# Patient Record
Sex: MALE | Race: WHITE | NOT HISPANIC OR LATINO | Employment: UNEMPLOYED | URBAN - METROPOLITAN AREA
[De-identification: names, ages, dates, MRNs, and addresses within clinical notes are randomized per-mention and may not be internally consistent; named-entity substitution may affect disease eponyms.]

---

## 2022-09-28 ENCOUNTER — HOSPITAL ENCOUNTER (EMERGENCY)
Facility: HOSPITAL | Age: 32
Discharge: PSYCHIATRIC OR SUBSTANCE USE FACILITY | End: 2022-09-29
Attending: EMERGENCY MEDICINE

## 2022-09-28 DIAGNOSIS — Z00.8 MEDICAL CLEARANCE FOR PSYCHIATRIC ADMISSION: ICD-10-CM

## 2022-09-28 DIAGNOSIS — R74.8 ELEVATED CPK: ICD-10-CM

## 2022-09-28 DIAGNOSIS — E87.6 HYPOKALEMIA: ICD-10-CM

## 2022-09-28 DIAGNOSIS — F29 PSYCHOSIS, UNSPECIFIED PSYCHOSIS TYPE: Primary | ICD-10-CM

## 2022-09-28 DIAGNOSIS — F29 PSYCHOSIS: ICD-10-CM

## 2022-09-28 PROBLEM — E87.20 METABOLIC ACIDOSIS: Status: ACTIVE | Noted: 2022-09-28

## 2022-09-28 PROBLEM — R41.82 ALTERED MENTAL STATUS: Status: ACTIVE | Noted: 2022-09-28

## 2022-09-28 PROBLEM — R94.31 PROLONGED Q-T INTERVAL ON ECG: Status: ACTIVE | Noted: 2022-09-28

## 2022-09-28 PROBLEM — D72.829 LEUKOCYTOSIS: Status: ACTIVE | Noted: 2022-09-28

## 2022-09-28 LAB
ALBUMIN SERPL BCP-MCNC: 3.4 G/DL (ref 3.5–5.2)
ALBUMIN SERPL BCP-MCNC: 3.9 G/DL (ref 3.5–5.2)
ALP SERPL-CCNC: 62 U/L (ref 55–135)
ALP SERPL-CCNC: 82 U/L (ref 55–135)
ALT SERPL W/O P-5'-P-CCNC: 26 U/L (ref 10–44)
ALT SERPL W/O P-5'-P-CCNC: 31 U/L (ref 10–44)
AMMONIA PLAS-SCNC: 32 UMOL/L (ref 10–50)
AMPHET+METHAMPHET UR QL: NEGATIVE
ANION GAP SERPL CALC-SCNC: 16 MMOL/L (ref 8–16)
ANION GAP SERPL CALC-SCNC: 8 MMOL/L (ref 8–16)
APAP SERPL-MCNC: <3 UG/ML (ref 10–20)
AST SERPL-CCNC: 32 U/L (ref 10–40)
AST SERPL-CCNC: 41 U/L (ref 10–40)
BARBITURATES UR QL SCN>200 NG/ML: NEGATIVE
BASOPHILS # BLD AUTO: 0.06 K/UL (ref 0–0.2)
BASOPHILS # BLD AUTO: 0.08 K/UL (ref 0–0.2)
BASOPHILS NFR BLD: 0.4 % (ref 0–1.9)
BASOPHILS NFR BLD: 0.5 % (ref 0–1.9)
BENZODIAZ UR QL SCN>200 NG/ML: NEGATIVE
BILIRUB SERPL-MCNC: 1.6 MG/DL (ref 0.1–1)
BILIRUB SERPL-MCNC: 1.9 MG/DL (ref 0.1–1)
BILIRUB UR QL STRIP: NEGATIVE
BUN SERPL-MCNC: 7 MG/DL (ref 6–20)
BUN SERPL-MCNC: 9 MG/DL (ref 6–20)
BZE UR QL SCN: NEGATIVE
CALCIUM SERPL-MCNC: 8.6 MG/DL (ref 8.7–10.5)
CALCIUM SERPL-MCNC: 8.6 MG/DL (ref 8.7–10.5)
CALCIUM SERPL-MCNC: 8.8 MG/DL (ref 8.7–10.5)
CALCIUM SERPL-MCNC: 8.8 MG/DL (ref 8.7–10.5)
CANNABINOIDS UR QL SCN: ABNORMAL
CHLORIDE SERPL-SCNC: 106 MMOL/L (ref 95–110)
CHLORIDE SERPL-SCNC: 106 MMOL/L (ref 95–110)
CHLORIDE SERPL-SCNC: 107 MMOL/L (ref 95–110)
CHLORIDE SERPL-SCNC: 107 MMOL/L (ref 95–110)
CK SERPL-CCNC: 614 U/L (ref 20–200)
CK SERPL-CCNC: 706 U/L (ref 20–200)
CK SERPL-CCNC: 792 U/L (ref 20–200)
CK SERPL-CCNC: 957 U/L (ref 20–200)
CLARITY UR: CLEAR
CO2 SERPL-SCNC: 18 MMOL/L (ref 23–29)
CO2 SERPL-SCNC: 26 MMOL/L (ref 23–29)
CO2 SERPL-SCNC: 26 MMOL/L (ref 23–29)
CO2 SERPL-SCNC: 29 MMOL/L (ref 23–29)
COLOR UR: YELLOW
CREAT SERPL-MCNC: 0.8 MG/DL (ref 0.5–1.4)
CREAT SERPL-MCNC: 0.9 MG/DL (ref 0.5–1.4)
CREAT SERPL-MCNC: 0.9 MG/DL (ref 0.5–1.4)
CREAT SERPL-MCNC: 1 MG/DL (ref 0.5–1.4)
CREAT UR-MCNC: 42.8 MG/DL (ref 23–375)
DIFFERENTIAL METHOD: ABNORMAL
DIFFERENTIAL METHOD: ABNORMAL
EOSINOPHIL # BLD AUTO: 0.1 K/UL (ref 0–0.5)
EOSINOPHIL # BLD AUTO: 0.1 K/UL (ref 0–0.5)
EOSINOPHIL NFR BLD: 0.3 % (ref 0–8)
EOSINOPHIL NFR BLD: 0.7 % (ref 0–8)
ERYTHROCYTE [DISTWIDTH] IN BLOOD BY AUTOMATED COUNT: 13.2 % (ref 11.5–14.5)
ERYTHROCYTE [DISTWIDTH] IN BLOOD BY AUTOMATED COUNT: 13.4 % (ref 11.5–14.5)
EST. GFR  (NO RACE VARIABLE): >60 ML/MIN/1.73 M^2
ETHANOL SERPL-MCNC: <10 MG/DL
GLUCOSE SERPL-MCNC: 107 MG/DL (ref 70–110)
GLUCOSE SERPL-MCNC: 107 MG/DL (ref 70–110)
GLUCOSE SERPL-MCNC: 123 MG/DL (ref 70–110)
GLUCOSE SERPL-MCNC: 96 MG/DL (ref 70–110)
GLUCOSE UR QL STRIP: NEGATIVE
HCT VFR BLD AUTO: 38.5 % (ref 40–54)
HCT VFR BLD AUTO: 41.5 % (ref 40–54)
HGB BLD-MCNC: 13.1 G/DL (ref 14–18)
HGB BLD-MCNC: 14.8 G/DL (ref 14–18)
HGB UR QL STRIP: NEGATIVE
IMM GRANULOCYTES # BLD AUTO: 0.07 K/UL (ref 0–0.04)
IMM GRANULOCYTES # BLD AUTO: 0.11 K/UL (ref 0–0.04)
IMM GRANULOCYTES NFR BLD AUTO: 0.6 % (ref 0–0.5)
IMM GRANULOCYTES NFR BLD AUTO: 0.6 % (ref 0–0.5)
KETONES UR QL STRIP: NEGATIVE
LEUKOCYTE ESTERASE UR QL STRIP: NEGATIVE
LYMPHOCYTES # BLD AUTO: 2.8 K/UL (ref 1–4.8)
LYMPHOCYTES # BLD AUTO: 5.2 K/UL (ref 1–4.8)
LYMPHOCYTES NFR BLD: 24.1 % (ref 18–48)
LYMPHOCYTES NFR BLD: 28 % (ref 18–48)
MAGNESIUM SERPL-MCNC: 1.7 MG/DL (ref 1.6–2.6)
MCH RBC QN AUTO: 30.4 PG (ref 27–31)
MCH RBC QN AUTO: 31 PG (ref 27–31)
MCHC RBC AUTO-ENTMCNC: 34 G/DL (ref 32–36)
MCHC RBC AUTO-ENTMCNC: 35.7 G/DL (ref 32–36)
MCV RBC AUTO: 87 FL (ref 82–98)
MCV RBC AUTO: 89 FL (ref 82–98)
METHADONE UR QL SCN>300 NG/ML: NEGATIVE
MONOCYTES # BLD AUTO: 1.1 K/UL (ref 0.3–1)
MONOCYTES # BLD AUTO: 1.8 K/UL (ref 0.3–1)
MONOCYTES NFR BLD: 9.3 % (ref 4–15)
MONOCYTES NFR BLD: 9.7 % (ref 4–15)
NEUTROPHILS # BLD AUTO: 11.3 K/UL (ref 1.8–7.7)
NEUTROPHILS # BLD AUTO: 7.6 K/UL (ref 1.8–7.7)
NEUTROPHILS NFR BLD: 61 % (ref 38–73)
NEUTROPHILS NFR BLD: 64.8 % (ref 38–73)
NITRITE UR QL STRIP: NEGATIVE
NRBC BLD-RTO: 0 /100 WBC
NRBC BLD-RTO: 0 /100 WBC
OPIATES UR QL SCN: NEGATIVE
PCP UR QL SCN>25 NG/ML: NEGATIVE
PH UR STRIP: 7 [PH] (ref 5–8)
PLATELET # BLD AUTO: 276 K/UL (ref 150–450)
PLATELET # BLD AUTO: 367 K/UL (ref 150–450)
PMV BLD AUTO: 10.6 FL (ref 9.2–12.9)
PMV BLD AUTO: 11 FL (ref 9.2–12.9)
POTASSIUM SERPL-SCNC: 2.3 MMOL/L (ref 3.5–5.1)
POTASSIUM SERPL-SCNC: 3.4 MMOL/L (ref 3.5–5.1)
POTASSIUM SERPL-SCNC: 3.4 MMOL/L (ref 3.5–5.1)
POTASSIUM SERPL-SCNC: 3.9 MMOL/L (ref 3.5–5.1)
PROT SERPL-MCNC: 5.6 G/DL (ref 6–8.4)
PROT SERPL-MCNC: 6.5 G/DL (ref 6–8.4)
PROT UR QL STRIP: NEGATIVE
RBC # BLD AUTO: 4.31 M/UL (ref 4.6–6.2)
RBC # BLD AUTO: 4.78 M/UL (ref 4.6–6.2)
SALICYLATES SERPL-MCNC: <5 MG/DL (ref 15–30)
SARS-COV-2 RDRP RESP QL NAA+PROBE: NEGATIVE
SODIUM SERPL-SCNC: 140 MMOL/L (ref 136–145)
SODIUM SERPL-SCNC: 141 MMOL/L (ref 136–145)
SODIUM SERPL-SCNC: 141 MMOL/L (ref 136–145)
SODIUM SERPL-SCNC: 143 MMOL/L (ref 136–145)
SP GR UR STRIP: 1.01 (ref 1–1.03)
TOXICOLOGY INFORMATION: ABNORMAL
TSH SERPL DL<=0.005 MIU/L-ACNC: 1.76 UIU/ML (ref 0.4–4)
URN SPEC COLLECT METH UR: NORMAL
UROBILINOGEN UR STRIP-ACNC: NEGATIVE EU/DL
VIT B12 SERPL-MCNC: 263 PG/ML (ref 210–950)
WBC # BLD AUTO: 11.79 K/UL (ref 3.9–12.7)
WBC # BLD AUTO: 18.54 K/UL (ref 3.9–12.7)

## 2022-09-28 PROCEDURE — U0002 COVID-19 LAB TEST NON-CDC: HCPCS | Performed by: EMERGENCY MEDICINE

## 2022-09-28 PROCEDURE — 63600175 PHARM REV CODE 636 W HCPCS: Performed by: NURSE PRACTITIONER

## 2022-09-28 PROCEDURE — 36415 COLL VENOUS BLD VENIPUNCTURE: CPT | Performed by: EMERGENCY MEDICINE

## 2022-09-28 PROCEDURE — 85025 COMPLETE CBC W/AUTO DIFF WBC: CPT | Mod: 91 | Performed by: INTERNAL MEDICINE

## 2022-09-28 PROCEDURE — 93010 EKG 12-LEAD: ICD-10-PCS | Mod: ,,, | Performed by: INTERNAL MEDICINE

## 2022-09-28 PROCEDURE — 25000003 PHARM REV CODE 250: Performed by: EMERGENCY MEDICINE

## 2022-09-28 PROCEDURE — 96372 THER/PROPH/DIAG INJ SC/IM: CPT | Performed by: EMERGENCY MEDICINE

## 2022-09-28 PROCEDURE — G0425 INPT/ED TELECONSULT30: HCPCS | Mod: GT,,, | Performed by: PSYCHIATRY & NEUROLOGY

## 2022-09-28 PROCEDURE — 63600175 PHARM REV CODE 636 W HCPCS: Performed by: EMERGENCY MEDICINE

## 2022-09-28 PROCEDURE — 82550 ASSAY OF CK (CPK): CPT | Mod: 91 | Performed by: INTERNAL MEDICINE

## 2022-09-28 PROCEDURE — 80179 DRUG ASSAY SALICYLATE: CPT | Performed by: EMERGENCY MEDICINE

## 2022-09-28 PROCEDURE — 25000003 PHARM REV CODE 250: Performed by: NURSE PRACTITIONER

## 2022-09-28 PROCEDURE — 84443 ASSAY THYROID STIM HORMONE: CPT | Performed by: EMERGENCY MEDICINE

## 2022-09-28 PROCEDURE — 80143 DRUG ASSAY ACETAMINOPHEN: CPT | Performed by: EMERGENCY MEDICINE

## 2022-09-28 PROCEDURE — 82550 ASSAY OF CK (CPK): CPT | Mod: 91 | Performed by: EMERGENCY MEDICINE

## 2022-09-28 PROCEDURE — 82077 ASSAY SPEC XCP UR&BREATH IA: CPT | Performed by: EMERGENCY MEDICINE

## 2022-09-28 PROCEDURE — 80307 DRUG TEST PRSMV CHEM ANLYZR: CPT | Performed by: EMERGENCY MEDICINE

## 2022-09-28 PROCEDURE — 93005 ELECTROCARDIOGRAM TRACING: CPT

## 2022-09-28 PROCEDURE — 99285 EMERGENCY DEPT VISIT HI MDM: CPT | Mod: 25

## 2022-09-28 PROCEDURE — 82550 ASSAY OF CK (CPK): CPT | Performed by: NURSE PRACTITIONER

## 2022-09-28 PROCEDURE — G0425 PR INPT TELEHEALTH CONSULT 30M: ICD-10-PCS | Mod: GT,,, | Performed by: PSYCHIATRY & NEUROLOGY

## 2022-09-28 PROCEDURE — 80048 BASIC METABOLIC PNL TOTAL CA: CPT | Mod: XB | Performed by: NURSE PRACTITIONER

## 2022-09-28 PROCEDURE — 81003 URINALYSIS AUTO W/O SCOPE: CPT | Mod: 59 | Performed by: EMERGENCY MEDICINE

## 2022-09-28 PROCEDURE — 93010 ELECTROCARDIOGRAM REPORT: CPT | Mod: ,,, | Performed by: INTERNAL MEDICINE

## 2022-09-28 PROCEDURE — 82140 ASSAY OF AMMONIA: CPT | Performed by: NURSE PRACTITIONER

## 2022-09-28 PROCEDURE — 83735 ASSAY OF MAGNESIUM: CPT | Performed by: EMERGENCY MEDICINE

## 2022-09-28 PROCEDURE — 80053 COMPREHEN METABOLIC PANEL: CPT | Performed by: EMERGENCY MEDICINE

## 2022-09-28 PROCEDURE — 80053 COMPREHEN METABOLIC PANEL: CPT | Mod: 91 | Performed by: INTERNAL MEDICINE

## 2022-09-28 PROCEDURE — 85025 COMPLETE CBC W/AUTO DIFF WBC: CPT | Performed by: EMERGENCY MEDICINE

## 2022-09-28 PROCEDURE — 36415 COLL VENOUS BLD VENIPUNCTURE: CPT | Performed by: NURSE PRACTITIONER

## 2022-09-28 PROCEDURE — 82607 VITAMIN B-12: CPT | Performed by: NURSE PRACTITIONER

## 2022-09-28 RX ORDER — LANOLIN ALCOHOL/MO/W.PET/CERES
800 CREAM (GRAM) TOPICAL ONCE
Status: COMPLETED | OUTPATIENT
Start: 2022-09-28 | End: 2022-09-28

## 2022-09-28 RX ORDER — POTASSIUM CHLORIDE 7.45 MG/ML
10 INJECTION INTRAVENOUS
Status: COMPLETED | OUTPATIENT
Start: 2022-09-28 | End: 2022-09-28

## 2022-09-28 RX ORDER — HALOPERIDOL LACTATE 5 MG/ML
5 INJECTION, SOLUTION INTRAMUSCULAR
Status: COMPLETED | OUTPATIENT
Start: 2022-09-28 | End: 2022-09-28

## 2022-09-28 RX ORDER — LORAZEPAM 2 MG/ML
2 INJECTION INTRAMUSCULAR
Status: COMPLETED | OUTPATIENT
Start: 2022-09-28 | End: 2022-09-28

## 2022-09-28 RX ORDER — DIPHENHYDRAMINE HYDROCHLORIDE 50 MG/ML
50 INJECTION INTRAMUSCULAR; INTRAVENOUS
Status: COMPLETED | OUTPATIENT
Start: 2022-09-28 | End: 2022-09-28

## 2022-09-28 RX ORDER — DEXTROSE MONOHYDRATE, SODIUM CHLORIDE, AND POTASSIUM CHLORIDE 50; 2.98; 4.5 G/1000ML; G/1000ML; G/1000ML
INJECTION, SOLUTION INTRAVENOUS ONCE
Status: COMPLETED | OUTPATIENT
Start: 2022-09-28 | End: 2022-09-28

## 2022-09-28 RX ORDER — CLONAZEPAM 0.5 MG/1
1 TABLET ORAL
Status: COMPLETED | OUTPATIENT
Start: 2022-09-28 | End: 2022-09-28

## 2022-09-28 RX ORDER — OLANZAPINE 5 MG/1
10 TABLET, ORALLY DISINTEGRATING ORAL
Status: DISCONTINUED | OUTPATIENT
Start: 2022-09-28 | End: 2022-09-28

## 2022-09-28 RX ADMIN — DIPHENHYDRAMINE HYDROCHLORIDE 50 MG: 50 INJECTION, SOLUTION INTRAMUSCULAR; INTRAVENOUS at 01:09

## 2022-09-28 RX ADMIN — SODIUM CHLORIDE 1000 ML: 0.9 INJECTION, SOLUTION INTRAVENOUS at 06:09

## 2022-09-28 RX ADMIN — Medication 800 MG: at 06:09

## 2022-09-28 RX ADMIN — CLONAZEPAM 1 MG: 0.5 TABLET ORAL at 07:09

## 2022-09-28 RX ADMIN — POTASSIUM CHLORIDE 10 MEQ: 7.46 INJECTION, SOLUTION INTRAVENOUS at 05:09

## 2022-09-28 RX ADMIN — DEXTROSE MONOHYDRATE, SODIUM CHLORIDE, AND POTASSIUM CHLORIDE: 50; 4.5; 2.98 INJECTION, SOLUTION INTRAVENOUS at 06:09

## 2022-09-28 RX ADMIN — LORAZEPAM 2 MG: 2 INJECTION INTRAMUSCULAR; INTRAVENOUS at 01:09

## 2022-09-28 RX ADMIN — POTASSIUM BICARBONATE 40 MEQ: 391 TABLET, EFFERVESCENT ORAL at 03:09

## 2022-09-28 RX ADMIN — POTASSIUM CHLORIDE 10 MEQ: 7.46 INJECTION, SOLUTION INTRAVENOUS at 03:09

## 2022-09-28 RX ADMIN — HALOPERIDOL LACTATE 5 MG: 5 INJECTION, SOLUTION INTRAMUSCULAR at 01:09
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
## 2022-09-28 NOTE — ASSESSMENT & PLAN NOTE
Mild, bicarb 18  Unclear etiology  No ketones in urine   CPK elevated at 957  Hydrate and repeat BMP at 1200, if no improvement, further work up as per clinical course
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
## 2022-09-28 NOTE — ED NOTES
Pt. Became verbally and physically aggressive towards ED tech while EKG was attempted to be performed.  Security was able to get pt. Back in bed.  Violent restraints put on all extremities, Charge nurse, Security, Primary nurse, ED tech, and MD at bedside.  MD stated he would order some medicine to calm him down and put an order in for violent restraints.

## 2022-09-28 NOTE — ASSESSMENT & PLAN NOTE
Admit to telemetry  Given 10mEq IV and 40 mEq pO in ED  Will start D5 1/2 NS with 40KCl for 1L   Repeat potassium at 1200   Oral electrolyte replacement protocol

## 2022-09-28 NOTE — ED NOTES
Pt. Calm and cooperative, D/C violent restraints, pt. Starting to calm down.  Will continue to monitor
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
## 2022-09-28 NOTE — PROVIDER PROGRESS NOTES - EMERGENCY DEPT.
Encounter Date: 9/28/2022    ED Physician Progress Notes        Physician Note:   Potassium is better on repeat.  Patient is medically clear for transfer to an inpatient psych facility at this time.

## 2022-09-28 NOTE — ED NOTES
Per the PFC, pt's acceptance has been rescinded r/t CK level. Will update if another placement becomes available

## 2022-09-28 NOTE — ED NOTES
Pt. Is awake alert and oriented; Pt. Up and ambulatory around room, calm and cooperative with no question of plan of care at this time
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
## 2022-09-28 NOTE — ASSESSMENT & PLAN NOTE
Replete electrolytes   Given haldol in ED, would hold off on any further QT prolonging medications  Monitor on telemetry   Mag 1.7 will give oral replacement

## 2022-09-28 NOTE — ASSESSMENT & PLAN NOTE
Unclear etiology and no previous on chart  Will get CXR to r/o any underlying infection  UA clear  Trend

## 2022-09-28 NOTE — SUBJECTIVE & OBJECTIVE
Past Medical History:   Diagnosis Date    Patient denies medical problems        Past Surgical History:   Procedure Laterality Date    denies         Review of patient's allergies indicates:  Not on File    No current facility-administered medications on file prior to encounter.     No current outpatient medications on file prior to encounter.     Family History       Problem Relation (Age of Onset)    No Known Problems Brother          Tobacco Use    Smoking status: Unknown    Smokeless tobacco: Not on file   Substance and Sexual Activity    Alcohol use: Not Currently    Drug use: Yes     Types: Marijuana    Sexual activity: Not on file     Review of Systems   Unable to perform ROS: Mental status change   Objective:     Vital Signs (Most Recent):  Temp: 99 °F (37.2 °C) (09/28/22 0116)  Pulse: 67 (09/28/22 0303)  Resp: 18 (09/28/22 0116)  BP: 117/62 (09/28/22 0303)  SpO2: 99 % (09/28/22 0303) Vital Signs (24h Range):  Temp:  [99 °F (37.2 °C)] 99 °F (37.2 °C)  Pulse:  [] 67  Resp:  [18] 18  SpO2:  [97 %-99 %] 99 %  BP: (117-184)/(62-76) 117/62        There is no height or weight on file to calculate BMI.    Physical Exam  Vitals and nursing note reviewed.   Constitutional:       Appearance: He is well-developed.      Comments: drowsy   HENT:      Head: Normocephalic and atraumatic.   Eyes:      Conjunctiva/sclera: Conjunctivae normal.      Pupils: Pupils are equal, round, and reactive to light.   Cardiovascular:      Rate and Rhythm: Normal rate. Rhythm irregular.   Pulmonary:      Effort: Pulmonary effort is normal.      Breath sounds: Normal breath sounds.   Abdominal:      General: Bowel sounds are normal.      Palpations: Abdomen is soft.   Musculoskeletal:         General: Normal range of motion.      Cervical back: Normal range of motion and neck supple.   Skin:     General: Skin is warm and dry.      Capillary Refill: Capillary refill takes less than 2 seconds.      Comments: Flushed, feet are dirty with  some abrasions   Neurological:      Mental Status: He is oriented to person, place, and time.   Psychiatric:         Behavior: Behavior normal.         Thought Content: Thought content normal.         Judgment: Judgment normal.         CRANIAL NERVES     CN III, IV, VI   Pupils are equal, round, and reactive to light.     Significant Labs: All pertinent labs within the past 24 hours have been reviewed.  CBC:   Recent Labs   Lab 09/28/22 0139   WBC 18.54*   HGB 14.8   HCT 41.5        CMP:   Recent Labs   Lab 09/28/22 0139      K 2.3*      CO2 18*   *   BUN 9   CREATININE 1.0   CALCIUM 8.8   PROT 6.5   ALBUMIN 3.9   BILITOT 1.6*   ALKPHOS 82   AST 41*   ALT 31   ANIONGAP 16     Magnesium:   Recent Labs   Lab 09/28/22 0139   MG 1.7     TSH:   Recent Labs   Lab 09/28/22 0139   TSH 1.765     Urine Studies:   Recent Labs   Lab 09/28/22  0128   COLORU Yellow   APPEARANCEUA Clear   PHUR 7.0   SPECGRAV 1.010   PROTEINUA Negative   GLUCUA Negative   KETONESU Negative   BILIRUBINUA Negative   OCCULTUA Negative   NITRITE Negative   UROBILINOGEN Negative   LEUKOCYTESUR Negative       Significant Imaging: I have reviewed all pertinent imaging results/findings within the past 24 hours.  EKG: I have reviewed all pertinent results/findings within the past 24 hours and my personal findings are: sinus arrhythmia with prolonged Qtc 572

## 2022-09-28 NOTE — ED NOTES
Pt remains in paper scrubs. Sitter at bedside maintaining 1:1 direct visual contact and charting q15 minute patient check. Pt resting in bed. NAD noted. Respirations even and unlabored. Will continue to monitor.  Patient completed

## 2022-09-28 NOTE — CONSULTS
"[facility]  Psychiatry  Telepsychiatry Consult Note    Please see previous notes:    Patient agreeable to consultation via telepsychiatry.    Tele-Consultation from Psychiatry started: 9/28/2022 at 7:00am  The chief complaint leading to psychiatric consultation is: psychosis  This consultation was requested by Dr Bruno, the Emergency Department attending physician.  The location of the consulting psychiatrist is  [place] .  The patient location is  [facility] EMERGENCY DEPARTMENT   The patient arrived at the ED at: [facility]    Also present with the patient at the time of the consultation: nobody    Patient Identification:   Willard Hightower is a 32 y.o. male.    Patient information was obtained from patient and past medical records.  Patient presented involuntarily to the Emergency Department by ambulance where the patient received see Ambulance Run Sheet prior to arrival.    Consults  Teleconsult Time Documentation  Subjective:     History of Present Illness:  33yo male with unclear psychiatric hx brought into the ED for bizarre behavior while at gas station. Patient became agitated while in the ED and received haldol 5mg, ativan 2mg, and benadryl 50mg. UDS + for THC    On interview, patient was quite lethargic- fell asleep repeatedly requiring ongoing verbal prompts, mumbled, and was difficult to understand. He reported he was brought to the ED from a gas station because he "needed help" but could not specify why. He did report he was using "weed cards" that he thinks has fentanyl in them. He could not tell me why he was in [place], "I guess I was trying to get out."  Denied SI and HI  He could not answer questions about his longitudinal hx.   This is the extent of patients complaints at this time  Unable to obtain ros due to inability to participate  AGATA: unable to assess  Psychiatric History:   Denied categorically but would not answer specific questions about this    Substance Abuse History:  Reports he uses " [employer] but did not answer about other questions    Legal History: Past charges/incarcerations: AGATA    Family Psychiatric History: AGATA      Social History:  From [place]. AGATA rest of hx    Psychiatric Mental Status Exam:  Arousal: lethargic  Sensorium/Orientation: oriented to AGATA   Behavior/Cooperation: semicoopertaive  Speech: soft, mumbles, decreased spontaneity  Language: AGATA  Mood: did not answer  Affect: blunted  Thought Process: blocked, poverty of thought  Thought Content:   Auditory hallucinations: AGATA  Visual hallucinations: AGATA  Paranoia: AGATA  Delusions:  AGATA  Suicidal ideation: AGATA  Homicidal ideation: AGATA  Attention/Concentration:  impaired  Memory:    Recent:  AGATA   Remote: AGATA     Fund of Knowledge: [facility]  Abstract reasoning: AGATA  Insight: limited awareness of illness  Judgment: limited      Past Medical History:   Past Medical History:   Diagnosis Date    Patient denies medical problems       Laboratory Data:   Labs Reviewed   CBC W/ AUTO DIFFERENTIAL - Abnormal; Notable for the following components:       Result Value    WBC 18.54 (*)     Immature Granulocytes 0.6 (*)     Gran # (ANC) 11.3 (*)     Immature Grans (Abs) 0.11 (*)     Lymph # 5.2 (*)     Mono # 1.8 (*)     All other components within normal limits   COMPREHENSIVE METABOLIC PANEL - Abnormal; Notable for the following components:    Potassium 2.3 (*)     CO2 18 (*)     Glucose 123 (*)     Total Bilirubin 1.6 (*)     AST 41 (*)     All other components within normal limits    Narrative:     potassium   critical result(s) called and verbal readback obtained   from murphy wynn  by CPW1 09/28/2022 02:17   DRUG SCREEN PANEL, URINE EMERGENCY - Abnormal; Notable for the following components:    THC Presumptive Positive (*)     All other components within normal limits    Narrative:     Specimen Source->Urine   ACETAMINOPHEN LEVEL - Abnormal; Notable for the following components:    Acetaminophen (Tylenol), Serum <3.0 (*)     All other components within  normal limits   SALICYLATE LEVEL - Abnormal; Notable for the following components:    Salicylate Lvl <5.0 (*)     All other components within normal limits   CK - Abnormal; Notable for the following components:     (*)     All other components within normal limits   TSH   URINALYSIS, REFLEX TO URINE CULTURE    Narrative:     Specimen Source->Urine   ALCOHOL,MEDICAL (ETHANOL)   SARS-COV-2 RNA AMPLIFICATION, QUAL   MAGNESIUM   AMMONIA   VITAMIN B12           Allergies:   Review of patient's allergies indicates:  Not on File    Medications in ER:   Medications   haloperidol lactate Syrg 5 mg (5 mg Intramuscular Given 9/28/22 0147)   lorazepam injection 2 mg (2 mg Intramuscular Given 9/28/22 0143)   diphenhydrAMINE injection 50 mg (50 mg Intramuscular Given 9/28/22 0145)   potassium chloride 10 mEq in 100 mL IVPB (0 mEq Intravenous Stopped 9/28/22 0615)   potassium bicarbonate disintegrating tablet 40 mEq (40 mEq Oral Given 9/28/22 0324)   dextrose 5 % and 0.45 % NaCl with KCl 40 mEq infusion ( Intravenous New Bag 9/28/22 0625)   potassium chloride 10 mEq in 100 mL IVPB (10 mEq Intravenous New Bag 9/28/22 0509)   magnesium oxide tablet 800 mg (800 mg Oral Given 9/28/22 0628)       Medications at home: reviewed with patient and in MAR    No new subjective & objective note has been filed under this hospital service since the last note was generated.      Assessment - Diagnosis - Goals:     Diagnosis/Impression:   Psychosis, unspecified  R/o substance induced psychotic disorder    Rec:   Recommend PEC given risk of harm to self and grave disability. Inpatient psychiatric tx once medically cleared.  Ativan 2mg IV/IM q 2 hours prn severe agitation  Will defer to inpatient psychiatric team to start/modify scheduled medications  If admitted to inpatient medicine recommend 1:1 sitter    Time with patient,coordinating care: 15min      More than 50% of the time was spent counseling/coordinating care    Consulting clinician  was informed of the encounter and consult note.    Consultation ended: 9/28/2022 at 7:22am    Fernando Ruiz MD  Psychiatry  [facility]

## 2022-09-29 VITALS
TEMPERATURE: 99 F | WEIGHT: 220.44 LBS | SYSTOLIC BLOOD PRESSURE: 156 MMHG | RESPIRATION RATE: 18 BRPM | HEART RATE: 84 BPM | DIASTOLIC BLOOD PRESSURE: 86 MMHG | OXYGEN SATURATION: 99 %

## 2022-09-29 LAB — CK SERPL-CCNC: 430 U/L (ref 20–200)

## 2022-09-29 PROCEDURE — 25000003 PHARM REV CODE 250: Performed by: EMERGENCY MEDICINE

## 2022-09-29 PROCEDURE — 93010 EKG 12-LEAD: ICD-10-PCS | Mod: ,,, | Performed by: INTERNAL MEDICINE

## 2022-09-29 PROCEDURE — 36415 COLL VENOUS BLD VENIPUNCTURE: CPT | Performed by: EMERGENCY MEDICINE

## 2022-09-29 PROCEDURE — 99205 PR OFFICE/OUTPT VISIT, NEW, LEVL V, 60-74 MIN: ICD-10-PCS | Mod: GT,,, | Performed by: PSYCHIATRY & NEUROLOGY

## 2022-09-29 PROCEDURE — 99205 OFFICE O/P NEW HI 60 MIN: CPT | Mod: GT,,, | Performed by: PSYCHIATRY & NEUROLOGY

## 2022-09-29 PROCEDURE — 93005 ELECTROCARDIOGRAM TRACING: CPT

## 2022-09-29 PROCEDURE — 93010 ELECTROCARDIOGRAM REPORT: CPT | Mod: ,,, | Performed by: INTERNAL MEDICINE

## 2022-09-29 PROCEDURE — 82550 ASSAY OF CK (CPK): CPT | Performed by: EMERGENCY MEDICINE

## 2022-09-29 RX ORDER — DIAZEPAM 5 MG/1
10 TABLET ORAL
Status: COMPLETED | OUTPATIENT
Start: 2022-09-29 | End: 2022-09-29

## 2022-09-29 RX ORDER — SODIUM CHLORIDE 9 MG/ML
1000 INJECTION, SOLUTION INTRAVENOUS
Status: COMPLETED | OUTPATIENT
Start: 2022-09-29 | End: 2022-09-29

## 2022-09-29 RX ADMIN — DIAZEPAM 10 MG: 5 TABLET ORAL at 08:09

## 2022-09-29 RX ADMIN — SODIUM CHLORIDE 1000 ML: 0.9 INJECTION, SOLUTION INTRAVENOUS at 09:09

## 2022-09-29 RX ADMIN — SODIUM CHLORIDE 1000 ML: 0.9 INJECTION, SOLUTION INTRAVENOUS at 06:09

## 2022-09-29 RX ADMIN — DIAZEPAM 10 MG: 5 TABLET ORAL at 10:09

## 2022-09-29 NOTE — CONSULTS
"  Consults  Consult Start Time: 09/29/2022 08:20 CDT  Consult End Time: 09/29/2022 09:20 CDT        ER Telepsychiatry Consult Follow Up Note  Willard Hightower  [id number]  9/29/2022    Consult requested by Dr. Joseph Melo  Start time of consultation 8:20 am    Chief Complaint /Reason for Consult: psychosis    History of Present Illness:   Please see telepsych consult from yesterday.    On interview by me today:  Pt. Reports inhaling weed cards, smokes marijuana, denies other recent drug use.  Denies alcohol use.  No recent prescribed medication.  Left [place] on Monday to "get away".  Denies SI/HI.  Feels somewhat nervous/anxious.  Has been working in a [employer] store.    Denies ever seeing a psychiatrist, denies being in a psychiatric hospital.  Denies h/o suicide attempt or violence.  In [facility] for one day[marijuana] around 2018.  Has been living with brother in a [place] suburb.  No child. Currently not in a relationship.    Pt.'s number, [phone]: relatives, Jose Cruz Patel: pt. Has been manic, not sleeping, not eating, not thinking clearly    BrotherJoseph, [phone]: stress at work; no h/o mental illness;    Scheduled Meds:   sodium chloride 0.9%  1,000 mL Intravenous ED 1 Time         Vitals:    09/29/22 0728   BP: 132/78   Pulse: 87   Resp:    Temp:            Labs/Imaging/Studies:   Recent Results (from the past 36 hour(s))   Urinalysis, Reflex to Urine Culture Urine, Clean Catch    Collection Time: 09/28/22  1:28 AM    Specimen: Urine   Result Value Ref Range    Specimen UA Urine, Clean Catch     Color, UA Yellow Yellow, Straw, Sonali    Appearance, UA Clear Clear    pH, UA 7.0 5.0 - 8.0    Specific Gravity, UA 1.010 1.005 - 1.030    Protein, UA Negative Negative    Glucose, UA Negative Negative    Ketones, UA Negative Negative    Bilirubin (UA) Negative Negative    Occult Blood UA Negative Negative    Nitrite, UA Negative Negative    Urobilinogen, UA Negative <2.0 EU/dL    Leukocytes, UA Negative Negative "   Drug screen panel, emergency    Collection Time: 09/28/22  1:28 AM   Result Value Ref Range    Benzodiazepines Negative Negative    Methadone metabolites Negative Negative    Cocaine (Metab.) Negative Negative    Opiate Scrn, Ur Negative Negative    Barbiturate Screen, Ur Negative Negative    Amphetamine Screen, Ur Negative Negative    THC Presumptive Positive (A) Negative    Phencyclidine Negative Negative    Creatinine, Urine 42.8 23.0 - 375.0 mg/dL    Toxicology Information SEE COMMENT    CBC auto differential    Collection Time: 09/28/22  1:39 AM   Result Value Ref Range    WBC 18.54 (H) 3.90 - 12.70 K/uL    RBC 4.78 4.60 - 6.20 M/uL    Hemoglobin 14.8 14.0 - 18.0 g/dL    Hematocrit 41.5 40.0 - 54.0 %    MCV 87 82 - 98 fL    MCH 31.0 27.0 - 31.0 pg    MCHC 35.7 32.0 - 36.0 g/dL    RDW 13.2 11.5 - 14.5 %    Platelets 367 150 - 450 K/uL    MPV 11.0 9.2 - 12.9 fL    Immature Granulocytes 0.6 (H) 0.0 - 0.5 %    Gran # (ANC) 11.3 (H) 1.8 - 7.7 K/uL    Immature Grans (Abs) 0.11 (H) 0.00 - 0.04 K/uL    Lymph # 5.2 (H) 1.0 - 4.8 K/uL    Mono # 1.8 (H) 0.3 - 1.0 K/uL    Eos # 0.1 0.0 - 0.5 K/uL    Baso # 0.08 0.00 - 0.20 K/uL    nRBC 0 0 /100 WBC    Gran % 61.0 38.0 - 73.0 %    Lymph % 28.0 18.0 - 48.0 %    Mono % 9.7 4.0 - 15.0 %    Eosinophil % 0.3 0.0 - 8.0 %    Basophil % 0.4 0.0 - 1.9 %    Differential Method Automated    Comprehensive metabolic panel    Collection Time: 09/28/22  1:39 AM   Result Value Ref Range    Sodium 140 136 - 145 mmol/L    Potassium 2.3 (LL) 3.5 - 5.1 mmol/L    Chloride 106 95 - 110 mmol/L    CO2 18 (L) 23 - 29 mmol/L    Glucose 123 (H) 70 - 110 mg/dL    BUN 9 6 - 20 mg/dL    Creatinine 1.0 0.5 - 1.4 mg/dL    Calcium 8.8 8.7 - 10.5 mg/dL    Total Protein 6.5 6.0 - 8.4 g/dL    Albumin 3.9 3.5 - 5.2 g/dL    Total Bilirubin 1.6 (H) 0.1 - 1.0 mg/dL    Alkaline Phosphatase 82 55 - 135 U/L    AST 41 (H) 10 - 40 U/L    ALT 31 10 - 44 U/L    Anion Gap 16 8 - 16 mmol/L    eGFR >60 >60 mL/min/1.73  m^2   TSH    Collection Time: 09/28/22  1:39 AM   Result Value Ref Range    TSH 1.765 0.400 - 4.000 uIU/mL   Ethanol    Collection Time: 09/28/22  1:39 AM   Result Value Ref Range    Alcohol, Serum <10 <10 mg/dL   Acetaminophen level    Collection Time: 09/28/22  1:39 AM   Result Value Ref Range    Acetaminophen (Tylenol), Serum <3.0 (L) 10.0 - 20.0 ug/mL   Salicylate level    Collection Time: 09/28/22  1:39 AM   Result Value Ref Range    Salicylate Lvl <5.0 (L) 15.0 - 30.0 mg/dL   Magnesium    Collection Time: 09/28/22  1:39 AM   Result Value Ref Range    Magnesium 1.7 1.6 - 2.6 mg/dL   COVID-19 Rapid Screening    Collection Time: 09/28/22  1:52 AM   Result Value Ref Range    SARS-CoV-2 RNA, Amplification, Qual Negative Negative   Ammonia    Collection Time: 09/28/22  3:19 AM   Result Value Ref Range    Ammonia 32 10 - 50 umol/L   Vitamin B12    Collection Time: 09/28/22  3:19 AM   Result Value Ref Range    Vitamin B-12 263 210 - 950 pg/mL   CK    Collection Time: 09/28/22  3:19 AM   Result Value Ref Range     (H) 20 - 200 U/L   Basic metabolic panel    Collection Time: 09/28/22  9:45 AM   Result Value Ref Range    Sodium 141 136 - 145 mmol/L    Potassium 3.4 (L) 3.5 - 5.1 mmol/L    Chloride 107 95 - 110 mmol/L    CO2 26 23 - 29 mmol/L    Glucose 107 70 - 110 mg/dL    BUN 7 6 - 20 mg/dL    Creatinine 0.9 0.5 - 1.4 mg/dL    Calcium 8.6 (L) 8.7 - 10.5 mg/dL    Anion Gap 8 8 - 16 mmol/L    eGFR >60 >60 mL/min/1.73 m^2   CBC auto differential    Collection Time: 09/28/22  9:45 AM   Result Value Ref Range    WBC 11.79 3.90 - 12.70 K/uL    RBC 4.31 (L) 4.60 - 6.20 M/uL    Hemoglobin 13.1 (L) 14.0 - 18.0 g/dL    Hematocrit 38.5 (L) 40.0 - 54.0 %    MCV 89 82 - 98 fL    MCH 30.4 27.0 - 31.0 pg    MCHC 34.0 32.0 - 36.0 g/dL    RDW 13.4 11.5 - 14.5 %    Platelets 276 150 - 450 K/uL    MPV 10.6 9.2 - 12.9 fL    Immature Granulocytes 0.6 (H) 0.0 - 0.5 %    Gran # (ANC) 7.6 1.8 - 7.7 K/uL    Immature Grans (Abs) 0.07 (H)  0.00 - 0.04 K/uL    Lymph # 2.8 1.0 - 4.8 K/uL    Mono # 1.1 (H) 0.3 - 1.0 K/uL    Eos # 0.1 0.0 - 0.5 K/uL    Baso # 0.06 0.00 - 0.20 K/uL    nRBC 0 0 /100 WBC    Gran % 64.8 38.0 - 73.0 %    Lymph % 24.1 18.0 - 48.0 %    Mono % 9.3 4.0 - 15.0 %    Eosinophil % 0.7 0.0 - 8.0 %    Basophil % 0.5 0.0 - 1.9 %    Differential Method Automated    CK    Collection Time: 09/28/22  9:45 AM   Result Value Ref Range     (H) 20 - 200 U/L   Comprehensive metabolic panel    Collection Time: 09/28/22  9:45 AM   Result Value Ref Range    Sodium 141 136 - 145 mmol/L    Potassium 3.4 (L) 3.5 - 5.1 mmol/L    Chloride 107 95 - 110 mmol/L    CO2 26 23 - 29 mmol/L    Glucose 107 70 - 110 mg/dL    BUN 7 6 - 20 mg/dL    Creatinine 0.9 0.5 - 1.4 mg/dL    Calcium 8.6 (L) 8.7 - 10.5 mg/dL    Total Protein 5.6 (L) 6.0 - 8.4 g/dL    Albumin 3.4 (L) 3.5 - 5.2 g/dL    Total Bilirubin 1.9 (H) 0.1 - 1.0 mg/dL    Alkaline Phosphatase 62 55 - 135 U/L    AST 32 10 - 40 U/L    ALT 26 10 - 44 U/L    Anion Gap 8 8 - 16 mmol/L    eGFR >60 >60 mL/min/1.73 m^2   Basic Metabolic Panel    Collection Time: 09/28/22  3:59 PM   Result Value Ref Range    Sodium 143 136 - 145 mmol/L    Potassium 3.9 3.5 - 5.1 mmol/L    Chloride 106 95 - 110 mmol/L    CO2 29 23 - 29 mmol/L    Glucose 96 70 - 110 mg/dL    BUN 7 6 - 20 mg/dL    Creatinine 0.8 0.5 - 1.4 mg/dL    Calcium 8.8 8.7 - 10.5 mg/dL    Anion Gap 8 8 - 16 mmol/L    eGFR >60 >60 mL/min/1.73 m^2   CK    Collection Time: 09/28/22  3:59 PM   Result Value Ref Range     (H) 20 - 200 U/L   CPK    Collection Time: 09/28/22  9:42 PM   Result Value Ref Range     (H) 20 - 200 U/L        Mental Status Exam:  Appearance: unremarkable, age appropriate  Behavior/Cooperation: normal, cooperative  Speech: normal tone, normal rate, normal pitch, normal volume  Mood: anxious  Affect: Decreased  Thought Process: some disorganization  Thought Content: denies SI/HI  Sensorium: says today is Tuesday  Cognition:  grossly intact  Insight: fair  Judgment: fair    Assessment/Recommendations     Imp:   Psychosis, unspecified, possibly related to stress and weed card/marijuana use, appears to be gradually resolving  Use of weed cards  Marijuana Use  EKG from yesterday showed QTc of 572  Serum potassium on presentation 2.3  Urine tox positive for THC    Rec:  - continue PEC/CEC for now  - no standing psychotropic medication for now  - Ativan PO/IM prn for agitation  - repeat EKG  - please contact brother, , [phone], if the patient is accepted by a psychiatric hospital;  is on his way from [place] to [place]    Total time, including chart review, time with patient, time obtaining collateral info[if necessary/possible]: 60 min    Roverto Avalos    9/29/2022

## 2022-09-29 NOTE — ED NOTES
Pt provided meal tray, NS bolus infusing, sitter remains at the bedside. Pt updated on plan of care and wait for telepsych consult. Will continue to monitor and update pt on plan of care.

## 2022-09-29 NOTE — ED NOTES
"Pt. Is calm and in bed, but endorses "jitterness" and states "I just feel a little anxious and want to run around the room", Pt. Requesting medication, MD made aware  "

## 2022-09-29 NOTE — ED NOTES
Assumed patient care.   Pt sleeping on stretcher, in NAD, respirations even and unlabored. Stretcher locked and in lowest position, sitter remains at the bedside. Will continue to monitor and update pt on plan of care.

## 2022-09-29 NOTE — ED NOTES
With patient permission, patient father updated on patient status and plan of care.     Pt Lamont anderson: [phone]

## 2022-09-29 NOTE — NURSING
Complete linen change. Hygiene care to per patient w/o assistance. Sitter at bedside. Pt is calm and cooperative.

## 2022-09-29 NOTE — ED NOTES
Pt. Up and ambulatory to rest room with no assistance and escorted by Security and RN; Pt. Room cleaned and pt. Given pillow and fresh blankets. Pt. Has no needs at this time, Sitter at bedside in direct view of patient

## 2022-09-29 NOTE — ED NOTES
Pt escorted to and from restroom without incident. Pt calm, cooperative and back in ED 12, sitter remains at the bedside.

## 2022-09-29 NOTE — ED NOTES
Pt reports restlessness, calm and speaking in normal tone. ED MD notified and will wait for further orders.

## 2022-09-29 NOTE — ED NOTES
Pt resting on stretcher in NAD, respirations even and unlabored, meal tray provided. Stretcher locked and in lowest position, sitter at the bedside. Pt offered restroom assistance, pt states no needs at this time, urinal within reach. Will continue to monitor and update pt on plan of care.

## 2022-09-29 NOTE — ED NOTES
Pt father and brother at the bedside, updated on plan of care and placement at [facility]. All questions and concerns addressed at this time. Pt calm and cooperative, accepting.   Sitter remains at the bedside.

## 2022-09-29 NOTE — ED NOTES
Pt resting on stretcher in NAD, respirations even and unlabored. Stretcher locked and in lowest position, sitter at the bedside. Pt offered restroom assistance, pt states no needs at this time, urinal at the bedside. Will continue to monitor and update pt on plan of care.

## 2022-09-29 NOTE — ED NOTES
Spoke to MD regarding repeat CPK; Pt. Has been medically cleared from the emergency department and no longer requires hospital admission. RN spoke to transfer center and according to [facility], the patients packet is still being sent out to all facilities.

## 2022-09-29 NOTE — ED NOTES
Pt resting on stretcher in NAD, calm and cooperative, respirations even and unlabored. Stretcher locked and in lowest position, sitter at the bedside. Pt offered restroom assistance, pt states no needs at this time, urinal at the bedside. Will continue to monitor and update pt on plan of care.

## 2022-09-29 NOTE — ED NOTES
Pt resting on stretcher in NAD, respirations even and unlabored. Stretcher locked and in lowest position, sitter at the bedside. Pt offered restroom assistance, pt states no needs at this time. Pt family member at the bedside. Will continue to monitor and update pt on plan of care.